# Patient Record
Sex: FEMALE | Race: WHITE | NOT HISPANIC OR LATINO
[De-identification: names, ages, dates, MRNs, and addresses within clinical notes are randomized per-mention and may not be internally consistent; named-entity substitution may affect disease eponyms.]

---

## 2019-01-28 ENCOUNTER — RX RENEWAL (OUTPATIENT)
Age: 74
End: 2019-01-28

## 2019-01-28 PROBLEM — Z00.00 ENCOUNTER FOR PREVENTIVE HEALTH EXAMINATION: Status: ACTIVE | Noted: 2019-01-28

## 2019-03-10 ENCOUNTER — RECORD ABSTRACTING (OUTPATIENT)
Age: 74
End: 2019-03-10

## 2019-03-10 DIAGNOSIS — M35.3 POLYMYALGIA RHEUMATICA: ICD-10-CM

## 2019-03-10 DIAGNOSIS — Z87.891 PERSONAL HISTORY OF NICOTINE DEPENDENCE: ICD-10-CM

## 2019-03-10 DIAGNOSIS — M81.8 OTHER OSTEOPOROSIS W/OUT CURRENT PATHOLOGICAL FRACTURE: ICD-10-CM

## 2019-03-10 LAB — CYTOLOGY CVX/VAG DOC THIN PREP: NORMAL

## 2019-03-10 RX ORDER — IBUPROFEN 200 MG
600 CAPSULE ORAL
Refills: 0 | Status: ACTIVE | COMMUNITY

## 2019-03-10 RX ORDER — IBUPROFEN 200 MG/1
TABLET, FILM COATED ORAL
Refills: 0 | Status: ACTIVE | COMMUNITY

## 2019-04-25 ENCOUNTER — APPOINTMENT (OUTPATIENT)
Dept: OBGYN | Facility: CLINIC | Age: 74
End: 2019-04-25
Payer: MEDICARE

## 2019-04-25 VITALS
SYSTOLIC BLOOD PRESSURE: 108 MMHG | WEIGHT: 115 LBS | DIASTOLIC BLOOD PRESSURE: 70 MMHG | HEIGHT: 67 IN | BODY MASS INDEX: 18.05 KG/M2

## 2019-04-25 DIAGNOSIS — Z84.89 FAMILY HISTORY OF OTHER SPECIFIED CONDITIONS: ICD-10-CM

## 2019-04-25 DIAGNOSIS — Z82.49 FAMILY HISTORY OF ISCHEMIC HEART DISEASE AND OTHER DISEASES OF THE CIRCULATORY SYSTEM: ICD-10-CM

## 2019-04-25 PROCEDURE — G0101: CPT

## 2019-04-25 RX ORDER — PREDNISONE 50 MG/1
TABLET ORAL
Refills: 0 | Status: ACTIVE | COMMUNITY

## 2019-04-25 NOTE — PHYSICAL EXAM
[Awake] : awake [Alert] : alert [Soft] : soft [Oriented x3] : oriented to person, place, and time [Vulvar Atrophy] : vulvar atrophy [Normal] : cervix [Atrophy] : atrophy [Normal Position] : in a normal position [No Bleeding] : there was no active vaginal bleeding [Uterine Adnexae] : were not tender and not enlarged [Nl Sphincter Tone] : normal sphincter tone [Scar] : a scar was noted [Acute Distress] : no acute distress [Thyroid Nodule] : no thyroid nodule [Mass] : no breast mass [Axillary LAD] : no axillary lymphadenopathy [Nipple Discharge] : no nipple discharge [Breast Palpation Diffuse Fibrous Tissue Bilateral] : fibrocystic changes [Tender] : non tender [FreeTextEntry7] : axial; seems nl size [Ovarian Mass (___ Cm)] : there were no adnexal masses [FreeTextEntry9] : no masses

## 2019-06-18 ENCOUNTER — RESULT REVIEW (OUTPATIENT)
Age: 74
End: 2019-06-18

## 2020-03-12 ENCOUNTER — TRANSCRIPTION ENCOUNTER (OUTPATIENT)
Age: 75
End: 2020-03-12

## 2020-03-24 ENCOUNTER — RX CHANGE (OUTPATIENT)
Age: 75
End: 2020-03-24

## 2020-03-26 ENCOUNTER — RX CHANGE (OUTPATIENT)
Age: 75
End: 2020-03-26

## 2020-03-27 ENCOUNTER — RX CHANGE (OUTPATIENT)
Age: 75
End: 2020-03-27

## 2020-06-10 ENCOUNTER — TRANSCRIPTION ENCOUNTER (OUTPATIENT)
Age: 75
End: 2020-06-10

## 2020-06-12 ENCOUNTER — TRANSCRIPTION ENCOUNTER (OUTPATIENT)
Age: 75
End: 2020-06-12

## 2020-11-20 PROBLEM — N60.19 DIFFUSE CYSTIC MASTOPATHY: Status: ACTIVE | Noted: 2019-03-10

## 2020-11-24 ENCOUNTER — APPOINTMENT (OUTPATIENT)
Dept: OBGYN | Facility: CLINIC | Age: 75
End: 2020-11-24
Payer: MEDICARE

## 2020-11-24 VITALS
DIASTOLIC BLOOD PRESSURE: 70 MMHG | SYSTOLIC BLOOD PRESSURE: 110 MMHG | HEIGHT: 67 IN | WEIGHT: 113 LBS | BODY MASS INDEX: 17.74 KG/M2

## 2020-11-24 DIAGNOSIS — N60.19 DIFFUSE CYSTIC MASTOPATHY OF UNSPECIFIED BREAST: ICD-10-CM

## 2020-11-24 PROCEDURE — G0101: CPT

## 2020-11-30 ENCOUNTER — RX CHANGE (OUTPATIENT)
Age: 75
End: 2020-11-30

## 2020-12-02 ENCOUNTER — RX CHANGE (OUTPATIENT)
Age: 75
End: 2020-12-02

## 2021-06-23 ENCOUNTER — APPOINTMENT (OUTPATIENT)
Dept: OBGYN | Facility: CLINIC | Age: 76
End: 2021-06-23
Payer: MEDICARE

## 2021-06-23 DIAGNOSIS — N39.0 URINARY TRACT INFECTION, SITE NOT SPECIFIED: ICD-10-CM

## 2021-06-23 PROCEDURE — 81003 URINALYSIS AUTO W/O SCOPE: CPT | Mod: QW

## 2021-06-24 LAB
BILIRUB UR QL STRIP: NORMAL
CLARITY UR: CLEAR
COLLECTION METHOD: NORMAL
GLUCOSE UR-MCNC: NORMAL
HCG UR QL: 0.2 EU/DL
HGB UR QL STRIP.AUTO: NORMAL
KETONES UR-MCNC: NORMAL
LEUKOCYTE ESTERASE UR QL STRIP: NORMAL
NITRITE UR QL STRIP: NORMAL
PH UR STRIP: 5
PROT UR STRIP-MCNC: 100
SP GR UR STRIP: 1.03

## 2021-06-25 LAB — BACTERIA UR CULT: NORMAL

## 2021-06-28 ENCOUNTER — NON-APPOINTMENT (OUTPATIENT)
Age: 76
End: 2021-06-28

## 2021-06-28 ENCOUNTER — LABORATORY RESULT (OUTPATIENT)
Age: 76
End: 2021-06-28

## 2021-06-28 ENCOUNTER — APPOINTMENT (OUTPATIENT)
Dept: OBGYN | Facility: CLINIC | Age: 76
End: 2021-06-28
Payer: MEDICARE

## 2021-06-28 VITALS
WEIGHT: 112 LBS | BODY MASS INDEX: 17.58 KG/M2 | SYSTOLIC BLOOD PRESSURE: 110 MMHG | DIASTOLIC BLOOD PRESSURE: 70 MMHG | HEIGHT: 67 IN

## 2021-06-28 DIAGNOSIS — R39.15 URGENCY OF URINATION: ICD-10-CM

## 2021-06-28 DIAGNOSIS — R10.2 PELVIC AND PERINEAL PAIN: ICD-10-CM

## 2021-06-28 PROCEDURE — 99214 OFFICE O/P EST MOD 30 MIN: CPT

## 2021-06-28 RX ORDER — NITROFURANTOIN (MONOHYDRATE/MACROCRYSTALS) 25; 75 MG/1; MG/1
100 CAPSULE ORAL
Qty: 14 | Refills: 0 | Status: DISCONTINUED | COMMUNITY
Start: 2021-06-23 | End: 2021-06-28

## 2021-06-29 LAB
APPEARANCE: ABNORMAL
BILIRUBIN URINE: NEGATIVE
BLOOD URINE: NORMAL
COLOR: NORMAL
GLUCOSE QUALITATIVE U: NEGATIVE
KETONES URINE: NEGATIVE
LEUKOCYTE ESTERASE URINE: NEGATIVE
NITRITE URINE: NEGATIVE
PH URINE: 6
PROTEIN URINE: NORMAL
SPECIFIC GRAVITY URINE: 1.02
UROBILINOGEN URINE: NORMAL

## 2021-06-30 LAB — BACTERIA UR CULT: NORMAL

## 2021-09-22 ENCOUNTER — TRANSCRIPTION ENCOUNTER (OUTPATIENT)
Age: 76
End: 2021-09-22

## 2021-10-14 ENCOUNTER — RESULT REVIEW (OUTPATIENT)
Age: 76
End: 2021-10-14

## 2021-11-02 ENCOUNTER — RESULT REVIEW (OUTPATIENT)
Age: 76
End: 2021-11-02

## 2021-12-30 PROBLEM — N95.2 POSTMENOPAUSAL ATROPHIC VAGINITIS: Status: ACTIVE | Noted: 2019-03-10

## 2022-01-03 ENCOUNTER — APPOINTMENT (OUTPATIENT)
Dept: OBGYN | Facility: CLINIC | Age: 77
End: 2022-01-03

## 2022-01-03 ENCOUNTER — APPOINTMENT (OUTPATIENT)
Dept: OBGYN | Facility: CLINIC | Age: 77
End: 2022-01-03
Payer: MEDICARE

## 2022-01-03 VITALS
BODY MASS INDEX: 17.89 KG/M2 | DIASTOLIC BLOOD PRESSURE: 70 MMHG | HEIGHT: 67 IN | SYSTOLIC BLOOD PRESSURE: 106 MMHG | WEIGHT: 114 LBS

## 2022-01-03 DIAGNOSIS — N95.2 POSTMENOPAUSAL ATROPHIC VAGINITIS: ICD-10-CM

## 2022-01-03 DIAGNOSIS — N95.1 MENOPAUSAL AND FEMALE CLIMACTERIC STATES: ICD-10-CM

## 2022-01-03 PROCEDURE — G0101: CPT

## 2022-01-03 RX ORDER — ALENDRONATE SODIUM 70 MG/1
70 TABLET ORAL
Refills: 0 | Status: DISCONTINUED | COMMUNITY
End: 2022-01-03

## 2022-01-05 ENCOUNTER — RX CHANGE (OUTPATIENT)
Age: 77
End: 2022-01-05

## 2022-11-13 ENCOUNTER — NON-APPOINTMENT (OUTPATIENT)
Age: 77
End: 2022-11-13

## 2023-05-18 ENCOUNTER — TRANSCRIPTION ENCOUNTER (OUTPATIENT)
Age: 78
End: 2023-05-18

## 2023-11-01 RX ORDER — ESTRADIOL 0.04 MG/D
0.04 PATCH, EXTENDED RELEASE TRANSDERMAL
Qty: 24 | Refills: 1 | Status: ACTIVE | COMMUNITY
Start: 2020-11-24

## 2024-01-03 PROBLEM — R92.30 DENSE BREAST TISSUE: Status: ACTIVE | Noted: 2021-06-28

## 2024-01-03 PROBLEM — Z13.820 OSTEOPOROSIS SCREENING: Status: ACTIVE | Noted: 2024-01-03

## 2024-01-03 PROBLEM — Z12.31 OTHER SCREENING MAMMOGRAM: Status: ACTIVE | Noted: 2021-06-28

## 2024-01-03 PROBLEM — Z01.419 ENCOUNTER FOR GYNECOLOGICAL EXAMINATION: Status: ACTIVE | Noted: 2019-04-25

## 2024-01-03 RX ORDER — ESTRADIOL 0.04 MG/D
0.04 PATCH, EXTENDED RELEASE TRANSDERMAL
Qty: 24 | Refills: 3 | Status: DISCONTINUED | COMMUNITY
Start: 2019-04-25 | End: 2024-01-03

## 2024-01-03 RX ORDER — ESTRADIOL 0.04 MG/D
0.04 PATCH, EXTENDED RELEASE TRANSDERMAL
Qty: 24 | Refills: 3 | Status: DISCONTINUED | COMMUNITY
Start: 2022-01-03 | End: 2024-01-03

## 2024-01-03 RX ORDER — ESTRADIOL 0.04 MG/D
0.04 PATCH, EXTENDED RELEASE TRANSDERMAL
Qty: 24 | Refills: 1 | Status: DISCONTINUED | COMMUNITY
End: 2024-01-03

## 2024-01-03 RX ORDER — ESTRADIOL 0.04 MG/D
0.04 PATCH, EXTENDED RELEASE TRANSDERMAL
Qty: 24 | Refills: 3 | Status: DISCONTINUED | COMMUNITY
Start: 2019-01-28 | End: 2024-01-03

## 2024-01-04 ENCOUNTER — APPOINTMENT (OUTPATIENT)
Dept: OBGYN | Facility: CLINIC | Age: 79
End: 2024-01-04
Payer: MEDICARE

## 2024-01-04 DIAGNOSIS — Z12.31 ENCOUNTER FOR SCREENING MAMMOGRAM FOR MALIGNANT NEOPLASM OF BREAST: ICD-10-CM

## 2024-01-04 DIAGNOSIS — R92.0 MAMMOGRAPHIC MICROCALCIFICATION FOUND ON DIAGNOSTIC IMAGING OF BREAST: ICD-10-CM

## 2024-01-04 DIAGNOSIS — Z13.820 ENCOUNTER FOR SCREENING FOR OSTEOPOROSIS: ICD-10-CM

## 2024-01-04 DIAGNOSIS — R92.30 DENSE BREASTS, UNSPECIFIED: ICD-10-CM

## 2024-01-04 DIAGNOSIS — Z01.419 ENCOUNTER FOR GYNECOLOGICAL EXAMINATION (GENERAL) (ROUTINE) W/OUT ABNORMAL FINDINGS: ICD-10-CM

## 2024-01-04 PROCEDURE — G0101: CPT

## 2024-01-04 NOTE — HISTORY OF PRESENT ILLNESS
[TextBox_4] : Feels well. No PMB. Prednisone 1 mg qod - excellent BD. Also on Gabapentin for fibromyalgia. Pt unwilling to try stopping ERT - Vivelle 0.0375 once weekly. Hx of kidney stones / sepsis. Also, urinary incontinence - usually w/o warning - just leaks. Wears Depends at night. Pt will d/w her Urologist, Dr. Cobian's office.  Has had RSV & flu vaccine. Also had Covid in 9/2023. Spikevax is planned soon.  Hip repl 2022. R total knee repl prior to that. Remains able to hike.  [Mammogramdate] : 11/2/21 [TextBox_19] : Heterogeneously dense; no suspicious calcifications in the right breast.  Annual routine screening is recommended. 10/14/21 R brst calc's requiring further eval. R mmg - heterogeneously dense; negative [BreastSonogramDate] : 10/14/21 [TextBox_25] : Neg [TextBox_31] : 1997 benign hysterectomy for fibroids; tubes & ovaries in place [BoneDensityDate] : 9/17/20 [TextBox_37] : T +1.5 at L1-4; left hip +0.7; left femoral neck +0.5. Ten-year fracture risk 17.3 and 1.7%. [TextBox_43] : Pt will check w/ Dr. Dejan Lorenzo

## 2024-01-04 NOTE — PHYSICAL EXAM
[Awake] : awake [Alert] : alert [Breast Palpation Diffuse Fibrous Tissue Bilateral] : fibrocystic changes [Soft] : soft [Scar] : a scar was noted [Oriented x3] : oriented to person, place, and time [Vulvar Atrophy] : vulvar atrophy [Normal] : vagina [Atrophy] : atrophy [No Bleeding] : there was no active vaginal bleeding [Absent] : absent [Uterine Adnexae] : were not tender and not enlarged [Nl Sphincter Tone] : normal sphincter tone [Chaperone Present] : A chaperone was present in the examining room during all aspects of the physical examination [Acute Distress] : no acute distress [Thyroid Nodule] : no thyroid nodule [Mass] : no breast mass [Nipple Discharge] : no nipple discharge [Axillary LAD] : no axillary lymphadenopathy [Tender] : non tender [Ovarian Mass (___ Cm)] : there were no adnexal masses [FreeTextEntry4] : tight 2 FB introitus [FreeTextEntry9] : no masses [FreeTextEntry1] : THEODORA Meadows

## 2025-03-06 ENCOUNTER — RESULT REVIEW (OUTPATIENT)
Age: 80
End: 2025-03-06

## 2025-04-30 ENCOUNTER — APPOINTMENT (OUTPATIENT)
Dept: ORTHOPEDIC SURGERY | Facility: CLINIC | Age: 80
End: 2025-04-30
Payer: MEDICARE

## 2025-04-30 VITALS
SYSTOLIC BLOOD PRESSURE: 113 MMHG | DIASTOLIC BLOOD PRESSURE: 74 MMHG | OXYGEN SATURATION: 98 % | BODY MASS INDEX: 18.44 KG/M2 | WEIGHT: 112 LBS | HEIGHT: 65.5 IN | HEART RATE: 80 BPM

## 2025-04-30 DIAGNOSIS — S86.011A STRAIN OF RIGHT ACHILLES TENDON, INITIAL ENCOUNTER: ICD-10-CM

## 2025-04-30 PROBLEM — Z00.00 ENCOUNTER FOR PREVENTIVE HEALTH EXAMINATION: Status: ACTIVE | Noted: 2025-04-30

## 2025-05-01 PROBLEM — S86.011A STRAIN OF RIGHT ACHILLES TENDON, INITIAL ENCOUNTER: Status: ACTIVE | Noted: 2025-04-30

## 2025-05-28 ENCOUNTER — APPOINTMENT (OUTPATIENT)
Dept: ORTHOPEDIC SURGERY | Facility: CLINIC | Age: 80
End: 2025-05-28